# Patient Record
Sex: MALE | Employment: OTHER | URBAN - METROPOLITAN AREA
[De-identification: names, ages, dates, MRNs, and addresses within clinical notes are randomized per-mention and may not be internally consistent; named-entity substitution may affect disease eponyms.]

---

## 2017-02-06 ENCOUNTER — TELEPHONE (OUTPATIENT)
Dept: INTERNAL MEDICINE CLINIC | Facility: CLINIC | Age: 82
End: 2017-02-06

## 2017-02-06 RX ORDER — ATENOLOL 25 MG/1
TABLET ORAL
Qty: 90 TABLET | Refills: 1 | Status: SHIPPED | OUTPATIENT
Start: 2017-02-06 | End: 2017-08-24

## 2017-02-06 RX ORDER — GLIPIZIDE 10 MG/1
TABLET ORAL
Qty: 180 TABLET | Refills: 1 | Status: SHIPPED | OUTPATIENT
Start: 2017-02-06

## 2017-02-06 NOTE — TELEPHONE ENCOUNTER
Pt. Is requestign a refill on: Glipizide & Atenlol   Ph. # [phone]    [facility].  # .[phone]     [facility]. # [phone]             Pt. Is out of meds

## 2017-02-17 ENCOUNTER — OFFICE VISIT (OUTPATIENT)
Dept: INTERNAL MEDICINE CLINIC | Facility: CLINIC | Age: 82
End: 2017-02-17

## 2017-02-17 VITALS
TEMPERATURE: 98 F | BODY MASS INDEX: 30.31 KG/M2 | HEART RATE: 62 BPM | SYSTOLIC BLOOD PRESSURE: 142 MMHG | DIASTOLIC BLOOD PRESSURE: 80 MMHG | HEIGHT: 68 IN | OXYGEN SATURATION: 98 % | WEIGHT: 200 LBS

## 2017-02-17 DIAGNOSIS — I25.119 ATHEROSCLEROSIS OF NATIVE CORONARY ARTERY OF NATIVE HEART WITH ANGINA PECTORIS (HCC): ICD-10-CM

## 2017-02-17 DIAGNOSIS — Z00.00 PHYSICAL EXAM: Primary | ICD-10-CM

## 2017-02-17 DIAGNOSIS — R97.20 ELEVATED PROSTATE SPECIFIC ANTIGEN (PSA): ICD-10-CM

## 2017-02-17 DIAGNOSIS — G20 PARALYSIS AGITANS (HCC): ICD-10-CM

## 2017-02-17 DIAGNOSIS — E78.00 PURE HYPERCHOLESTEROLEMIA: ICD-10-CM

## 2017-02-17 DIAGNOSIS — E55.9 VITAMIN D DEFICIENCY: ICD-10-CM

## 2017-02-17 DIAGNOSIS — I44.60 LEFT BUNDLE BRANCH HEMIBLOCK: ICD-10-CM

## 2017-02-17 DIAGNOSIS — I10 ESSENTIAL HYPERTENSION: ICD-10-CM

## 2017-02-17 DIAGNOSIS — E11.9 TYPE 2 DIABETES MELLITUS WITHOUT COMPLICATION, WITHOUT LONG-TERM CURRENT USE OF INSULIN (HCC): ICD-10-CM

## 2017-02-17 DIAGNOSIS — M54.50 BILATERAL LOW BACK PAIN WITHOUT SCIATICA, UNSPECIFIED CHRONICITY: ICD-10-CM

## 2017-02-17 PROCEDURE — 99214 OFFICE O/P EST MOD 30 MIN: CPT | Performed by: INTERNAL MEDICINE

## 2017-02-17 PROCEDURE — G0463 HOSPITAL OUTPT CLINIC VISIT: HCPCS | Performed by: INTERNAL MEDICINE

## 2017-02-17 PROCEDURE — G0439 PPPS, SUBSEQ VISIT: HCPCS | Performed by: INTERNAL MEDICINE

## 2017-02-17 NOTE — PATIENT INSTRUCTIONS
1. Physical exam  Physical exam instruction: Improve diet and exercise, complete fasting labs in the near future and you will be called with results 5-7 days after completed, call with questions.   2. Type 2 diabetes mellitus without complication, without l information from the [address] [place] [address] regarding Advance Directives.

## 2017-02-17 NOTE — PROGRESS NOTES
Medardo Petersen is a 81year old male who presents for a Medicare Annual Wellness visit.     Patient presents with:  Physical: dealing with his wife wo has been ill, still driving and doing well  Parkinson's: still active, no dropping off, stiffness, no droppi Friends    If you are a male age 38-65 or a female age 47-67, do you take aspirin?: Yes    Have you had any immunizations at another office such as Influenza, Hepatitis B, Tetanus, or Pneumococcal?: No     Functional Ability     Bathing or Showering: Able Correct    What year is it?: Correct    Recall \"Ball\": Correct    Recall \"Flag\": Correct    Recall \"Tree\": Correct         PREVENTATIVE SERVICES  INDICATIONS AND SCHEDULE Internal Lab or Procedure External Lab or Procedure   Diabetes Screening      H POTASSIUM (P) (mmol/L)   Date Value   07/22/2016 4.3    No flowsheet data found. Creatinine  Annually CREATININE (P) (mg/dL)   Date Value   07/22/2016 0.81    No flowsheet data found.     Digoxin Serum Conc  Annually No results found for: DIGOXIN No flow Surgical History    CABG  2001    ELECTROCARDIOGRAM, COMPLETE  12-    Comment Scanned to media tab - DOS 12-      Family History   Problem Relation Age of Onset   • Diabetes Mother    • Heart Disorder Sister 61     CHF   • Diabetes Sister II through XII intact, coordination looks mildly slowed, both resting and intention tremor, but finger to nose test is only mildly impaired. He is limited by his arthritis.   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  An information packet, including necessary form from the [employer] website. http://[email or web address] [place].state. [place]/public/books/advin.htm  A link to the [employer]

## 2017-03-24 RX ORDER — QUINAPRIL 20 MG/1
20 TABLET ORAL DAILY
Qty: 90 TABLET | Refills: 1 | Status: SHIPPED | OUTPATIENT
Start: 2017-03-24 | End: 2017-08-24

## 2017-04-25 ENCOUNTER — TELEPHONE (OUTPATIENT)
Dept: INTERNAL MEDICINE CLINIC | Facility: CLINIC | Age: 82
End: 2017-04-25

## 2017-04-25 NOTE — TELEPHONE ENCOUNTER
I do not know what this is, I be very careful communicating with someone that I do not know if you should be exchanging information with, make sure you are touching base of the patient before speaking with this person.   If he gives permission and you docum

## 2017-04-25 NOTE — TELEPHONE ENCOUNTER
Jackie Parada / Irma Randall calling in order for pt to stay enrolled in the [address] they need to know if pt has had Cardiovascular disease, Diabetes or Heart Failure,   please call [phone]     Tasked to nursing

## 2017-08-25 RX ORDER — QUINAPRIL 20 MG/1
TABLET ORAL
Qty: 90 TABLET | Refills: 0 | Status: SHIPPED | OUTPATIENT
Start: 2017-08-25

## 2017-08-25 RX ORDER — ATENOLOL 25 MG/1
TABLET ORAL
Qty: 90 TABLET | Refills: 0 | Status: SHIPPED | OUTPATIENT
Start: 2017-08-25

## (undated) NOTE — MR AVS SNAPSHOT
[facility] [address] [address]  [phone]               Thank you for choosing us for your health care visit with Lyle Aceves DO. We are glad to serve you and happy to provide you with this summary of your visit. 8. 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 Bilateral low back pain without sciatica, unspecified chronicity  Stable, cont monitoring    10.  Vitamin D deficiency  Stable, cont mon Take 1 capsule by mouth daily. Quinapril HCl 20 MG Tabs   Take 1 tablet (20 mg total) by mouth daily. Commonly known as:  ACCUPRIL           simvastatin 40 MG Tabs   Take 1 tablet (40 mg total) by mouth nightly.    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 Home exercises work great.  Put more priority on exe